# Patient Record
(demographics unavailable — no encounter records)

---

## 2025-06-04 NOTE — PHYSICAL EXAM
[MA] : MA [Appropriately responsive] : appropriately responsive [Alert] : alert [No Acute Distress] : no acute distress [Soft] : soft [Non-tender] : non-tender [Non-distended] : non-distended [No HSM] : No HSM [No Lesions] : no lesions [No Mass] : no mass [Oriented x3] : oriented x3 [Examination Of The Breasts] : a normal appearance [No Masses] : no breast masses were palpable [Labia Majora] : normal [Labia Minora] : normal [Normal] : normal [Uterine Adnexae] : normal [FreeTextEntry2] : Abhishek

## 2025-06-04 NOTE — PLAN
[FreeTextEntry1] : -+IUP and +FH seen on sono today -f/u PAP and GC/CT done today -f/u prenatal blood work drawn today -Rx sent for PNV and Doxylamine-Pyridoxine   -Early GCT due to GDM in prev pregnancy 
Jewelry/Money (specify)/Clothing